# Patient Record
Sex: FEMALE | Race: WHITE | NOT HISPANIC OR LATINO | Employment: FULL TIME | URBAN - METROPOLITAN AREA
[De-identification: names, ages, dates, MRNs, and addresses within clinical notes are randomized per-mention and may not be internally consistent; named-entity substitution may affect disease eponyms.]

---
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
## 2020-10-07 ENCOUNTER — OFFICE VISIT CONVERTED (OUTPATIENT)
Dept: FAMILY MEDICINE CLINIC | Age: 20
End: 2020-10-07
Attending: FAMILY MEDICINE

## 2020-10-07 ENCOUNTER — HOSPITAL ENCOUNTER (OUTPATIENT)
Dept: OTHER | Facility: HOSPITAL | Age: 20
Discharge: HOME | End: 2020-10-07
Attending: FAMILY MEDICINE

## 2020-10-09 LAB — BACTERIA SPEC AEROBE CULT: NORMAL

## 2020-10-11 LAB — SARS-COV-2 RNA SPEC QL NAA+PROBE: NOT DETECTED

## 2020-10-22 ENCOUNTER — OFFICE VISIT CONVERTED (OUTPATIENT)
Dept: FAMILY MEDICINE CLINIC | Age: 20
End: 2020-10-22
Attending: FAMILY MEDICINE

## 2020-10-22 ENCOUNTER — HOSPITAL ENCOUNTER (OUTPATIENT)
Dept: OTHER | Facility: HOSPITAL | Age: 20
Discharge: HOME | End: 2020-10-22
Attending: FAMILY MEDICINE

## 2020-10-22 LAB
ALBUMIN SERPL-MCNC: 4.9 G/DL (ref 3.5–5)
ALBUMIN/GLOB SERPL: 2.2 {RATIO} (ref 1.4–2.6)
ALP SERPL-CCNC: 89 U/L (ref 50–130)
ALT SERPL-CCNC: 13 U/L (ref 10–40)
ANION GAP SERPL CALC-SCNC: 19 MMOL/L (ref 8–19)
AST SERPL-CCNC: 19 U/L (ref 15–50)
BASOPHILS # BLD AUTO: 0.07 10*3/UL (ref 0–0.2)
BASOPHILS NFR BLD AUTO: 0.7 % (ref 0–3)
BILIRUB SERPL-MCNC: 0.33 MG/DL (ref 0.2–1.3)
BUN SERPL-MCNC: 12 MG/DL (ref 5–25)
BUN/CREAT SERPL: 16 {RATIO} (ref 6–20)
CALCIUM SERPL-MCNC: 9.3 MG/DL (ref 8.7–10.4)
CHLORIDE SERPL-SCNC: 104 MMOL/L (ref 99–111)
CONV ABS IMM GRAN: 0.02 10*3/UL (ref 0–0.2)
CONV CO2: 23 MMOL/L (ref 22–32)
CONV IMMATURE GRAN: 0.2 % (ref 0–1.8)
CONV TOTAL PROTEIN: 7.1 G/DL (ref 6.3–8.2)
CREAT UR-MCNC: 0.77 MG/DL (ref 0.5–0.9)
DEPRECATED RDW RBC AUTO: 37.4 FL (ref 36.4–46.3)
EOSINOPHIL # BLD AUTO: 0.27 10*3/UL (ref 0–0.7)
EOSINOPHIL # BLD AUTO: 2.5 % (ref 0–7)
ERYTHROCYTE [DISTWIDTH] IN BLOOD BY AUTOMATED COUNT: 11.9 % (ref 11.7–14.4)
GFR SERPLBLD BASED ON 1.73 SQ M-ARVRAT: >60 ML/MIN/{1.73_M2}
GLOBULIN UR ELPH-MCNC: 2.2 G/DL (ref 2–3.5)
GLUCOSE SERPL-MCNC: 98 MG/DL (ref 65–99)
HCT VFR BLD AUTO: 44.3 % (ref 37–47)
HGB BLD-MCNC: 15.4 G/DL (ref 12–16)
LYMPHOCYTES # BLD AUTO: 3.79 10*3/UL (ref 1–5)
LYMPHOCYTES NFR BLD AUTO: 35.5 % (ref 20–45)
MCH RBC QN AUTO: 29.9 PG (ref 27–31)
MCHC RBC AUTO-ENTMCNC: 34.8 G/DL (ref 33–37)
MCV RBC AUTO: 86 FL (ref 81–99)
MONOCYTES # BLD AUTO: 0.6 10*3/UL (ref 0.2–1.2)
MONOCYTES NFR BLD AUTO: 5.6 % (ref 3–10)
NEUTROPHILS # BLD AUTO: 5.93 10*3/UL (ref 2–8)
NEUTROPHILS NFR BLD AUTO: 55.5 % (ref 30–85)
NRBC CBCN: 0 % (ref 0–0.7)
OSMOLALITY SERPL CALC.SUM OF ELEC: 294 MOSM/KG (ref 273–304)
PLATELET # BLD AUTO: 222 10*3/UL (ref 130–400)
PMV BLD AUTO: 11.8 FL (ref 9.4–12.3)
POTASSIUM SERPL-SCNC: 3.8 MMOL/L (ref 3.5–5.3)
RBC # BLD AUTO: 5.15 10*6/UL (ref 4.2–5.4)
SODIUM SERPL-SCNC: 142 MMOL/L (ref 135–147)
TSH SERPL-ACNC: 1.32 M[IU]/L (ref 0.27–4.2)
WBC # BLD AUTO: 10.68 10*3/UL (ref 4.8–10.8)

## 2020-12-08 ENCOUNTER — HOSPITAL ENCOUNTER (OUTPATIENT)
Dept: OTHER | Facility: HOSPITAL | Age: 20
Discharge: HOME | End: 2020-12-08
Attending: FAMILY MEDICINE

## 2020-12-10 LAB — SARS-COV-2 RNA SPEC QL NAA+PROBE: NOT DETECTED

## 2021-03-24 ENCOUNTER — OFFICE VISIT CONVERTED (OUTPATIENT)
Dept: FAMILY MEDICINE CLINIC | Age: 21
End: 2021-03-24
Attending: NURSE PRACTITIONER

## 2021-03-24 ENCOUNTER — HOSPITAL ENCOUNTER (OUTPATIENT)
Dept: OTHER | Facility: HOSPITAL | Age: 21
Discharge: HOME | End: 2021-03-24
Attending: NURSE PRACTITIONER

## 2021-03-24 LAB
BASOPHILS # BLD MANUAL: 0.06 10*3/UL (ref 0–0.2)
BASOPHILS NFR BLD MANUAL: 0.7 % (ref 0–3)
DEPRECATED RDW RBC AUTO: 38.6 FL
EOSINOPHIL # BLD MANUAL: 0.29 10*3/UL (ref 0–0.7)
EOSINOPHIL NFR BLD MANUAL: 3.2 % (ref 0–7)
ERYTHROCYTE [DISTWIDTH] IN BLOOD BY AUTOMATED COUNT: 12.5 % (ref 11.5–14.5)
GRANS (ABSOLUTE): 3.69 10*3/UL (ref 2–8)
GRANS: 41.2 % (ref 30–85)
HBA1C MFR BLD: 15.6 G/DL (ref 12–16)
HCT VFR BLD AUTO: 45.5 % (ref 37–47)
IMM GRANULOCYTES # BLD: 0.02 10*3/UL (ref 0–0.54)
IMM GRANULOCYTES NFR BLD: 0.2 % (ref 0–0.43)
LYMPHOCYTES # BLD MANUAL: 4.36 10*3/UL (ref 1–5)
LYMPHOCYTES NFR BLD MANUAL: 6.1 % (ref 3–10)
MCH RBC QN AUTO: 29.2 PG (ref 27–31)
MCHC RBC AUTO-ENTMCNC: 34.3 G/DL (ref 33–37)
MCV RBC AUTO: 85.2 FL (ref 81–99)
MONOCYTES # BLD AUTO: 0.55 10*3/UL (ref 0.2–1.2)
PLATELET # BLD AUTO: 220 10*3/UL (ref 130–400)
PMV BLD AUTO: 10.6 FL (ref 7.4–10.4)
RBC # BLD AUTO: 5.34 10*6/UL (ref 4.2–5.4)
VARIANT LYMPHS NFR BLD MANUAL: 48.6 % (ref 20–45)
WBC # BLD AUTO: 8.97 10*3/UL (ref 4.8–10.8)

## 2021-03-25 LAB
25(OH)D3 SERPL-MCNC: 18.5 NG/ML (ref 30–100)
ALBUMIN SERPL-MCNC: 4.7 G/DL (ref 3.5–5)
ALBUMIN/GLOB SERPL: 1.9 {RATIO} (ref 1.4–2.6)
ALP SERPL-CCNC: 72 U/L (ref 42–98)
ALT SERPL-CCNC: 14 U/L (ref 10–40)
ANION GAP SERPL CALC-SCNC: 18 MMOL/L (ref 8–19)
AST SERPL-CCNC: 18 U/L (ref 15–50)
BILIRUB SERPL-MCNC: 0.25 MG/DL (ref 0.2–1.3)
BUN SERPL-MCNC: 9 MG/DL (ref 5–25)
BUN/CREAT SERPL: 10 {RATIO} (ref 6–20)
CALCIUM SERPL-MCNC: 9.1 MG/DL (ref 8.7–10.4)
CHLORIDE SERPL-SCNC: 105 MMOL/L (ref 99–111)
CONV CO2: 22 MMOL/L (ref 22–32)
CONV TOTAL PROTEIN: 7.2 G/DL (ref 6.3–8.2)
CREAT UR-MCNC: 0.93 MG/DL (ref 0.5–0.9)
GFR SERPLBLD BASED ON 1.73 SQ M-ARVRAT: >60 ML/MIN/{1.73_M2}
GLOBULIN UR ELPH-MCNC: 2.5 G/DL (ref 2–3.5)
GLUCOSE SERPL-MCNC: 99 MG/DL (ref 65–99)
OSMOLALITY SERPL CALC.SUM OF ELEC: 291 MOSM/KG (ref 273–304)
POTASSIUM SERPL-SCNC: 4.1 MMOL/L (ref 3.5–5.3)
SODIUM SERPL-SCNC: 141 MMOL/L (ref 135–147)
TSH SERPL-ACNC: 2.01 M[IU]/L (ref 0.27–4.2)

## 2021-04-06 ENCOUNTER — HOSPITAL ENCOUNTER (OUTPATIENT)
Dept: OTHER | Facility: HOSPITAL | Age: 21
Discharge: HOME | End: 2021-04-06
Attending: PEDIATRICS

## 2021-04-06 LAB
ALBUMIN SERPL-MCNC: 4.8 G/DL (ref 3.5–5)
ALBUMIN/GLOB SERPL: 2 {RATIO} (ref 1.4–2.6)
ALP SERPL-CCNC: 73 U/L (ref 42–98)
ALT SERPL-CCNC: 14 U/L (ref 10–40)
ANION GAP SERPL CALC-SCNC: 17 MMOL/L (ref 8–19)
AST SERPL-CCNC: 18 U/L (ref 15–50)
BASOPHILS # BLD AUTO: 0.07 10*3/UL (ref 0–0.2)
BASOPHILS NFR BLD AUTO: 0.8 % (ref 0–3)
BILIRUB SERPL-MCNC: 0.33 MG/DL (ref 0.2–1.3)
BUN SERPL-MCNC: 9 MG/DL (ref 5–25)
BUN/CREAT SERPL: 10 {RATIO} (ref 6–20)
CALCIUM SERPL-MCNC: 9.2 MG/DL (ref 8.7–10.4)
CHLORIDE SERPL-SCNC: 105 MMOL/L (ref 99–111)
CHOLEST SERPL-MCNC: 167 MG/DL (ref 107–200)
CHOLEST/HDLC SERPL: 3.2 {RATIO} (ref 3–6)
CONV ABS IMM GRAN: 0.02 10*3/UL (ref 0–0.2)
CONV CO2: 22 MMOL/L (ref 22–32)
CONV IMMATURE GRAN: 0.2 % (ref 0–1.8)
CONV TOTAL PROTEIN: 7.2 G/DL (ref 6.3–8.2)
CREAT UR-MCNC: 0.91 MG/DL (ref 0.5–0.9)
DEPRECATED RDW RBC AUTO: 40 FL (ref 36.4–46.3)
EOSINOPHIL # BLD AUTO: 0.22 10*3/UL (ref 0–0.7)
EOSINOPHIL # BLD AUTO: 2.5 % (ref 0–7)
ERYTHROCYTE [DISTWIDTH] IN BLOOD BY AUTOMATED COUNT: 12.7 % (ref 11.7–14.4)
GFR SERPLBLD BASED ON 1.73 SQ M-ARVRAT: >60 ML/MIN/{1.73_M2}
GLOBULIN UR ELPH-MCNC: 2.4 G/DL (ref 2–3.5)
GLUCOSE SERPL-MCNC: 109 MG/DL (ref 65–99)
HCT VFR BLD AUTO: 45.7 % (ref 37–47)
HDLC SERPL-MCNC: 52 MG/DL (ref 40–60)
HGB BLD-MCNC: 15.6 G/DL (ref 12–16)
LDLC SERPL CALC-MCNC: 95 MG/DL (ref 70–100)
LYMPHOCYTES # BLD AUTO: 3.16 10*3/UL (ref 1–5)
LYMPHOCYTES NFR BLD AUTO: 35.8 % (ref 20–45)
MCH RBC QN AUTO: 29.7 PG (ref 27–31)
MCHC RBC AUTO-ENTMCNC: 34.1 G/DL (ref 33–37)
MCV RBC AUTO: 87 FL (ref 81–99)
MONOCYTES # BLD AUTO: 0.48 10*3/UL (ref 0.2–1.2)
MONOCYTES NFR BLD AUTO: 5.4 % (ref 3–10)
NEUTROPHILS # BLD AUTO: 4.88 10*3/UL (ref 2–8)
NEUTROPHILS NFR BLD AUTO: 55.3 % (ref 30–85)
NRBC CBCN: 0 % (ref 0–0.7)
OSMOLALITY SERPL CALC.SUM OF ELEC: 289 MOSM/KG (ref 273–304)
PLATELET # BLD AUTO: 213 10*3/UL (ref 130–400)
PMV BLD AUTO: 11.1 FL (ref 9.4–12.3)
POTASSIUM SERPL-SCNC: 3.8 MMOL/L (ref 3.5–5.3)
RBC # BLD AUTO: 5.25 10*6/UL (ref 4.2–5.4)
SODIUM SERPL-SCNC: 140 MMOL/L (ref 135–147)
TRIGL SERPL-MCNC: 101 MG/DL (ref 40–150)
VLDLC SERPL-MCNC: 20 MG/DL (ref 5–37)
WBC # BLD AUTO: 8.83 10*3/UL (ref 4.8–10.8)

## 2021-04-07 LAB — ESTRADIOL SERPL HS-MCNC: 12.5 PG/ML

## 2021-04-11 LAB
CONV TESTOSTERONE, FREE: 182 PG/ML
TESTOST FREE MFR SERPL: 2.5 %
TESTOST SERPL-MCNC: 726 NG/DL

## 2021-05-18 NOTE — PROGRESS NOTES
"Anthony Willams  2000     Office/Outpatient Visit    Visit Date: Thu, Oct 22, 2020 10:54 am    Provider: Lonnie Childs MD (Assistant: Nichole Morrow MA)    Location: [facility]        Electronically signed by Lonnie Childs MD on  10/22/2020 04:41:29 PM                             Subjective:        CC: Robb is a 19 year old White female.  This is her first visit to the clinic.  Establish care;         HPI:       Pt reports depression and anxiety are \"manageable.\" He notices tachycardia during anxiety attacks. Pt has depression since the age of 14 or 15. This runs in his family and he attributes it to several issues at that time. He sees Radha Davison phD in psychology for counseling in [place]. He is on pristiq 50 mg qd, lamictal 150 mg BID, and buspar. These are prescribed by [facility] at [facility].    ROS:     CONSTITUTIONAL:  Negative for chills, fatigue and fever.      EYES:  Negative for blurred vision.      E/N/T:  Negative for diminished hearing, nasal congestion and sore throat.      CARDIOVASCULAR:  Positive for tachycardia.   Negative for chest pain or palpitations.      RESPIRATORY:  Negative for recent cough and dyspnea.      GASTROINTESTINAL:  Negative for abdominal pain, dysphagia, constipation, diarrhea, nausea and vomiting.      GENITOURINARY:  Negative for dysuria and urinary incontinence.      MUSCULOSKELETAL:  Negative for arthralgias and myalgias.      INTEGUMENTARY/BREAST:  Negative for atypical mole(s) and rash.      NEUROLOGICAL:  Positive for dizziness.   Negative for paresthesias or weakness.      PSYCHIATRIC:  Positive for anxiety and depression.   Negative for sleep disturbance or suicidal thoughts.          Past Medical History / Family History / Social History:         Last Reviewed on 10/22/2020 11:24 AM by Lonnie Childs    Past Medical History:     UNREMARKABLE             PAST MEDICAL HISTORY         Asthma         GYNECOLOGICAL " HISTORY:        Menarche occurred at age 12.  Assigned sex at birth: Female.  Gender identity: Male.  Sexual orientation: Bisexual.          Surgical History:     NONE         Family History:     Unremarkable         Social History:     Occupation: a .   [employer]     Marital Status: Single     Children: None     Hobbies/Recreation: she enjoys reading and writing;     Exercise: Primary form of exercise is walking.          Tobacco/Alcohol/Supplements:     Last Reviewed on 10/22/2020 11:24 AM by Lonnie Childs    Tobacco: She has a past history of cigarette smoking; quit date:  10/1/20.  Non-drinker     Caffeine:  She admits to consuming caffeine via coffee ( 1 serving per day ) and soda ( 1 serving per day ).          Substance Abuse History:     Last Reviewed on 10/22/2020 11:24 AM by Lonnie Childs    None         Mental Health History:     Last Reviewed on 10/22/2020 11:24 AM by Lonnie Childs        Generalized Anxiety Disorder     Gender Identity Disorder     Major Depression    Bipolar Disorder         Communicable Diseases (eg STDs):     Last Reviewed on 10/22/2020 11:24 AM by Lonnie Childs        Current Problems:     Last Reviewed on 10/22/2020 11:24 AM by Lonnie Childs    Major depressive disorder, recurrent, moderate    Acute pharyngitis, unspecified    Encounter for screening for depression    Encounter for immunization        Immunizations:     DTaP 2005    DTaP 2001    DTaP 2001    DTaP 2001    DTaP 2002    DTaP 2005    PedvaxHIB (Hib PRP-OMP) 2001    PedvaxHIB (Hib PRP-OMP) 2001    PedvaxHIB (Hib PRP-OMP) 2001    PedvaxHIB (Hib PRP-OMP) 2002    Hep B (pedi/adol, 3-dose schedule) 2000    Hep B (pedi/adol, 3-dose schedule) 2001    Hep B (pedi/adol, 3-dose schedule) 2001    Menveo 2011    zzGardasil 2011    zzGardasil 2011    zzGardasil 2013    IPV  Poliovirus, inactivated 2001    IPV   Poliovirus, inactivated 4/26/2001    IPV  Poliovirus, inactivated 6/28/2001    IPV  Poliovirus, inactivated 1/7/2005    MMR  (Measles-Mumps-Rubella), live 3/8/2002    MMR  (Measles-Mumps-Rubella), live 1/7/2005    Varicella, live 7/31/2002    Varicella, live 4/28/2008    Fluarix pf 10/19/2011    Influenza A (H1N1), IM (3+ years) Monovalent 1/6/2010    Influenza, split virus (3+ years dose) 10/31/2005    Influenza, split virus (3+ years dose) 10/22/2007    Influenza, split virus (3+ years dose) 10/19/2010    FluMist 10/6/2009    FluMist 11/7/2012    Flumist Quadrivalent 10/10/2013    Adacel (Tdap) 8/12/2011        Allergies:     Last Reviewed on 10/22/2020 11:24 AM by Lonnie Childs    No Known Allergies.        Current Medications:     Last Reviewed on 10/22/2020 11:24 AM by Lonnie Childs    busPIRone 10 mg oral tablet [take 2 tablets daily]    ondansetron 4 mg oral Tablet,disintegrating    Probiotic     Singulair 10 mg oral tablet [take 1 tablet (10 mg) by oral route once daily in the evening]    Symbicort 80-4.5 mcg/actuation Inhalation HFA Aerosol Inhaler    Claritin 10 mg oral tablet [1 tab daily]    HYDROXYZ HCL TAB 25MG  [2 tablets TID. PRN]    LAMOTRIGINE  TAB 150MG  [Twice daily ]    DESVENLAFAX  TAB 50MG ER    TESTOST CYP  INJ 200MG/ML  [Once weekly. ]    COMFORT       TAB 0.35MG        Objective:        Vitals:         Current: 10/22/2020 11:01:41 AM    Ht:  5 ft, 5.5 in (68.11%);  Wt: 129 lbs (51.63%);  BMI: 21.1T: 97.4 F (temporal);  BP: 124/72 mm Hg (right arm, sitting);  P: 98 bpm (right arm (BP Cuff), sitting);  sCr: 0.5 mg/dL;  GFR: 183.01        Exams:     PHYSICAL EXAM:     GENERAL: vital signs recorded - well developed, well nourished;  well groomed;  no apparent distress;     EYES: extraocular movements intact; conjunctiva and cornea are normal; PERRLA;     E/N/T: OROPHARYNX: posterior pharynx, including tonsils, tongue, and uvula are normal;     NECK: range of motion is normal; thyroid exam  reveals not enlarged;     RESPIRATORY: normal respiratory rate and pattern with no distress; normal breath sounds with no rales, rhonchi, wheezes or rubs;     CARDIOVASCULAR: mildly tachycardic;  rhythm is regular;  no systolic murmur; no edema;     GASTROINTESTINAL: nontender; normal bowel sounds;     MUSCULOSKELETAL: normal gait; normal overall tone     NEUROLOGIC: mental status: alert and oriented x 3; GROSSLY INTACT     PSYCHIATRIC:  appropriate affect and demeanor; normal speech pattern; grossly normal memory;         Procedures:     Encounter for immunization    1. Patient experienced no reaction.  Regarding contraindications to an Influenza vaccine: Denies moderate/severe illness with/without fever; serious reaction to eggs, egg proteins, gentamicin, gelatin, arginine, neomycin or polymixin; serious reaction after recieving previous influenza vaccines; and history of Guillain-Otto Syndrome.              Assessment:         Z23   Encounter for immunization       F33.1   Major depressive disorder, recurrent, moderate           ORDERS:         Lab Orders:       58058  [facility] CBC with 3 part diff  (Send-Out)            40580  COMP [facility] Comp. Metabolic Panel  (Send-Out)            37185  TSH - [facility] TSH  (Send-Out)              Procedures Ordered:       [id number]  Immunization administration; one vaccine  (In-House)              Other Orders:       51271  Influenza virus vaccine, quadrivalent, split virus, preservative free 3 years of age & older  (In-House)                      Plan:         Encounter for immunization          Immunizations:       88628  Immunization administration; one vaccine  (In-House)            08205  Influenza virus vaccine, quadrivalent, split virus, preservative free 3 years of age & older  (In-House)                Dose (ml): 0.5  Site: right deltoid  Route: intramuscular  Administered by: Nichole Morrow          : [employer]  Lot #: [id number]  Exp: 06/30/2021           NDC: [address]-80        Major depressive disorder, recurrent, moderateDepression seems well controlled with counseling and meds. Cont pristiq 50 mg qd, lamictal 150 mg BID, and buspar. These are prescribed by Aileen Stover [facility].    LABORATORY:  Labs ordered to be performed today include CBC, Comprehensive metabolic panel, and TSH.            Orders:       [id number]  [facility] CBC with 3 part diff  (Send-Out)            83979  COMP - [facility] Comp. Metabolic Panel  (Send-Out)            50515  TSH - [facility] TSH  (Send-Out)                  Charge Capture:         Primary Diagnosis:     Z23  Encounter for immunization           Orders:      [id number]  Office/outpatient visit; established patient, level 3  (In-House)            93999  Immunization administration; one vaccine  (In-House)            99540  Influenza virus vaccine, quadrivalent, split virus, preservative free 3 years of age & older  (In-House)              F33.1  Major depressive disorder, recurrent, moderate

## 2021-05-18 NOTE — PROGRESS NOTES
Anthony Willams  2000     Office/Outpatient Visit    Visit Date: Wed, Mar 24, 2021 01:31 pm    Provider: Ruth Ann Larose N.P. (Assistant: Aye Munoz MA)    Location: [facility]        Electronically signed by Ruth Ann Larose N.P. on  2021 02:23:12 PM                             Subjective:        CC: Robb is a 20 year old White female.  Patient presents today for establishment (not taking Claritin, Buspirone, Probiotic, Singulair);         HPI: Anthony is here today with c/o fatigue, headaches, rise in blood pressure, increased heart rate, depersonalization. Pt  has depression since the age of 14 or 15. This runs in his family. He sees Radha Davison phD in psychology for counseling in [place]. He is on pristiq 50 mg qd, lamictal 150 mg BID. These are prescribed by Aileen Stover at [facility]. Also seeing Alvarado Bhagat at [facility] who has him on testosterone hormones. Denies any recent medication changes. Notes mood swings are manageable but anxiety has been bad recently. Notes that heart rate seems to be high every other day. Denies school being increasingly stressful recently.    ROS:     CONSTITUTIONAL:  Positive for fatigue.   Negative for chills or fever.      CARDIOVASCULAR:  Positive for palpitations and tachycardia.      RESPIRATORY:  Negative for dyspnea and frequent wheezing.      GASTROINTESTINAL:  Negative for abdominal pain and vomiting.      INTEGUMENTARY/BREAST:  Negative for rash.      NEUROLOGICAL:  Negative for dizziness and headaches.      PSYCHIATRIC:  Positive for anxiety.   Negative for suicidal thoughts.          Past Medical History / Family History / Social History:         Last Reviewed on 3/24/2021 01:50 PM by Ruth Ann Larose    Past Medical History:                 PAST MEDICAL HISTORY         Asthma     Depression    Anxiety    Bipolar Disorder         GYNECOLOGICAL HISTORY:        Menarche occurred at age 12.  Assigned sex at birth:  Female.  Gender identity: Male.  Sexual orientation: Bisexual.          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          Tobacco/Alcohol/Supplements:     Last Reviewed on 3/24/2021 01:36 PM by Aye Munoz    Tobacco: She has a past history of cigarette smoking; quit date:  10/1/20.   Uses E-cig Non-drinker     Caffeine:  She admits to consuming caffeine via coffee ( 1 serving per day ) and soda ( 1 serving per day ).          Substance Abuse History:     Last Reviewed on 10/22/2020 11:24 AM by Lonnie Childs    None         Mental Health History:     Last Reviewed on 10/22/2020 11:24 AM by Lonnie Childs        Generalized Anxiety Disorder     Gender Identity Disorder     Major Depression    Bipolar Disorder         Communicable Diseases (eg STDs):     Last Reviewed on 10/22/2020 11:24 AM by Lonnie Childs        Current Problems:     Last Reviewed on 10/22/2020 11:24 AM by Lonnie Childs    Contact with and (suspected) exposure to other viral communicable diseases    Major depressive disorder, recurrent, moderate    Acute pharyngitis, unspecified    Encounter for screening for depression    Encounter for immunization        Immunizations:     influenza, injectable, quadrivalent, preservative free (FLUZONE QUAD 3500-2695) 10/22/2020    DTaP 4/7/2005    DTaP 2/22/2001    DTaP 4/26/2001    DTaP 6/28/2001    DTaP 7/31/2002    DTaP 1/7/2005    PedvaxHIB (Hib PRP-OMP) 2/22/2001    PedvaxHIB (Hib PRP-OMP) 4/26/2001    PedvaxHIB (Hib PRP-OMP) 6/28/2001    PedvaxHIB (Hib PRP-OMP) 7/31/2002    Hep B (pedi/adol, 3-dose schedule) 2000    Hep B (pedi/adol, 3-dose schedule) 2/22/2001    Hep B (pedi/adol,  3-dose schedule) 6/28/2001    Menveo 8/12/2011    zzGardasil 8/12/2011    zzGardasil 12/12/2011    zzGardasil 1/29/2013    IPV  Poliovirus, inactivated 2/22/2001    IPV  Poliovirus, inactivated 4/26/2001    IPV  Poliovirus, inactivated 6/28/2001    IPV  Poliovirus, inactivated 1/7/2005    MMR  (Measles-Mumps-Rubella), live 3/8/2002    MMR  (Measles-Mumps-Rubella), live 1/7/2005    Varicella, live 7/31/2002    Varicella, live 4/28/2008    Fluarix pf 10/19/2011    Influenza A (H1N1), IM (3+ years) Monovalent 1/6/2010    Influenza, split virus (3+ years dose) 10/31/2005    Influenza, split virus (3+ years dose) 10/22/2007    Influenza, split virus (3+ years dose) 10/19/2010    FluMist 10/6/2009    FluMist 11/7/2012    Flumist Quadrivalent 10/10/2013    Adacel (Tdap) 8/12/2011        Allergies:     Last Reviewed on 3/24/2021 01:36 PM by Aye Munoz    No Known Allergies.        Current Medications:     Last Reviewed on 3/24/2021 01:36 PM by Aye Munoz    ondansetron 4 mg oral Tablet,disintegrating    Symbicort 80-4.5 mcg/actuation Inhalation HFA Aerosol Inhaler    HYDROXYZ HCL TAB 25MG  [2 tablets TID. PRN]    LAMOTRIGINE  TAB 150MG  [Twice daily ]    DESVENLAFAX  TAB 50MG ER    TESTOST CYP  INJ 200MG/ML  [Once weekly. ]    COMFORT       TAB 0.35MG        Objective:        Vitals:         Historical:     10/22/2020  BP:   124/72 mm Hg ( (right arm, , sitting, );) 10/22/2020  P:   98bpm ( (right arm (BP Cuff), , sitting, );) 10/22/2020  Wt:   129lbs    Current: 3/24/2021 1:37:12 PM    Ht:  5 ft, 5.5 in;  Wt: 136 lbs;  BMI: 22.3T: 97.1 F (temporal);  BP: 114/70 mm Hg (right arm, sitting);  P: 95 bpm (right arm (BP Cuff), sitting);  sCr: 0.77 mg/dL;  GFR: 111.79        Exams:     PHYSICAL EXAM:     GENERAL: well developed, well nourished;  no apparent distress;     RESPIRATORY: normal respiratory rate and pattern with no distress; normal breath sounds with no rales, rhonchi, wheezes or rubs;     CARDIOVASCULAR:  normal rate; rhythm is regular;     MUSCULOSKELETAL: normal gait;     NEUROLOGIC: mental status: alert and oriented x 3; GROSSLY INTACT     PSYCHIATRIC: appropriate affect and demeanor; normal speech pattern; normal thought and perception;         Assessment:         R00.0   Tachycardia, unspecified       R53.83   Other fatigue       F31.9   Bipolar disorder, unspecified           ORDERS:         Lab Orders:       [id number]  [facility] CBC with 3 part diff  (Send-Out)            03508  COMP - [facility] Comp. Metabolic Panel  (Send-Out)            28584  TSH - [facility] TSH  (Send-Out)            40083  VITD - [facility] Vitamin D, 25 Hydroxy  (Send-Out)                      Plan: Greater than 30 minutes spent reviewing past medical records, face to face visit, and coordinating care with support staff.         Tachycardia, unspecifiedDiscussed recommendation for ECG and possible Holter monitor today. Anthony denies ECG at this time after discussing process of how performed.  Will proceed with lab work. Have also encouraged him to go to bed and wake at same time each day. Avoid caffeine late in day. Have also suggested that he get in soon with Aileen Stover as he may need some medication adjustments made.    LABORATORY:  Labs ordered to be performed today include CBC, Comprehensive metabolic panel, TSH, and Vitamin D.            Orders:       [id number]  [facility] CBC with 3 part diff  (Send-Out)            58379  [facility] Comp. Metabolic Panel  (Send-Out)            45004  TSH - [facility] TSH  (Send-Out)            30794  VITD - [facility] Vitamin D, 25 Hydroxy  (Send-Out)              Other fatigueAs above         Bipolar disorder, unspecifiedFollow with counselor and mental health provider.             Charge Capture:         Primary Diagnosis:     R00.0  Tachycardia, unspecified           Orders:      05122  Office/outpatient visit; established patient, level 4  (In-House)              R53.83  Other fatigue     F31.9  Bipolar disorder, unspecified

## 2021-05-18 NOTE — PROGRESS NOTES
Anthony Willams  2000     Office/Outpatient Visit    Visit Date: Wed, Oct 7, 2020 12:57 pm    Provider: Lonnie Childs MD (Assistant: Lori Zayas MA)    Location: [facility]        Electronically signed by Lonnie Childs MD on  10/07/2020 05:17:25 PM                             Subjective:        CC: Robb is a 19 year old White female.  This is her first visit to the clinic.  presents today due to sore throat, fatigue         HPI:       Pt is worried he has strep throat, his mother sees white spots and there is pain with talking and swallowing. He has fatigue and motion sickness for the past couple of days. Sx started 4-5 days with sore throat, fatigue for 2 days. No fever, maybe chills, maybe nausea. Worst Sx is sore throat. He works at [employer]. He had similar Sx a little over a month ago and rapid strep was [facility] at [facility].      Pt has depression since the age of 14 or 15. This runs in his family and he attributes it to several issues at that time. He sees Radha Davison phD in psychology for counseling in [place]. He is on pristiq 50 mg qd, lamictal 150 mg BID, and buspar. These are prescribed by [facility] at [facility].          PHQ-9 Depression Screening: Completed form scanned and in chart; Total Score 21     ROS:     CONSTITUTIONAL:  Positive for chills and fatigue ( moderate ).   Negative for fever.      EYES:  Negative for blurred vision.      E/N/T:  Positive for sore throat.   Negative for diminished hearing or nasal congestion.      CARDIOVASCULAR:  Negative for chest pain and palpitations.      RESPIRATORY:  Negative for recent cough and dyspnea.      GASTROINTESTINAL:  Positive for dysphagia and nausea.   Negative for abdominal pain, constipation, diarrhea or vomiting.      GENITOURINARY:  Negative for dysuria and urinary incontinence.      MUSCULOSKELETAL:  Negative for arthralgias and myalgias.       INTEGUMENTARY/BREAST:  Negative for atypical mole(s) and rash.      NEUROLOGICAL:  Positive for dizziness.   Negative for paresthesias or weakness.      PSYCHIATRIC:  Positive for anxiety and depression.   Negative for sleep disturbance or suicidal thoughts.          Past Medical History / Family History / Social History:         Last Reviewed on 10/07/2020 01:29 PM by Lonnie Childs    Past Medical History:     UNREMARKABLE             PAST MEDICAL HISTORY         Asthma         GYNECOLOGICAL HISTORY:        Menarche occurred at age 12.  Assigned sex at birth: Female.  Gender identity: Male.  Sexual orientation: Bisexual.          Surgical History:     NONE         Family History:     Unremarkable         Social History:     Occupation: a .   [employer]     Marital Status: Single     Children: None     Hobbies/Recreation: she enjoys reading and writing;     Exercise: Primary form of exercise is walking.          Tobacco/Alcohol/Supplements:     Last Reviewed on 10/07/2020 01:29 PM by Lonnie Childs    Tobacco: She has a past history of cigarette smoking; quit date:  10/1/20.  Non-drinker     Caffeine:  She admits to consuming caffeine via coffee ( 1 serving per day ) and soda ( 1 serving per day ).          Substance Abuse History:     Last Reviewed on 10/07/2020 01:29 PM by Lonnie Childs    None         Mental Health History:     Last Reviewed on 10/07/2020 01:29 PM by Lonnie Childs        Generalized Anxiety Disorder     Gender Identity Disorder     Major Depression    Bipolar Disorder         Communicable Diseases (eg STDs):     Last Reviewed on 10/07/2020 01:29 PM by Lonnie Childs        Current Problems:     Last Reviewed on 10/07/2020 01:29 PM by Lonnie Childs    Allergic rhinitis    Proteinuria    Acute pharyngitis, unspecified        Immunizations:     DTaP 2005    DTaP 2001    DTaP 2001    DTaP 2001    DTaP 2002    DTaP 2005    PedvaxHIB (Hib  PRP-OMP) 2/22/2001    PedvaxHIB (Hib PRP-OMP) 4/26/2001    PedvaxHIB (Hib PRP-OMP) 6/28/2001    PedvaxHIB (Hib PRP-OMP) 7/31/2002    Hep B (pedi/adol, 3-dose schedule) 2000    Hep B (pedi/adol, 3-dose schedule) 2/22/2001    Hep B (pedi/adol, 3-dose schedule) 6/28/2001    Menveo 8/12/2011    zzGardasil 8/12/2011    zzGardasil 12/12/2011    zzGardasil 1/29/2013    IPV  Poliovirus, inactivated 2/22/2001    IPV  Poliovirus, inactivated 4/26/2001    IPV  Poliovirus, inactivated 6/28/2001    IPV  Poliovirus, inactivated 1/7/2005    MMR  (Measles-Mumps-Rubella), live 3/8/2002    MMR  (Measles-Mumps-Rubella), live 1/7/2005    Varicella, live 7/31/2002    Varicella, live 4/28/2008    Fluarix pf 10/19/2011    Influenza A (H1N1), IM (3+ years) Monovalent 1/6/2010    Influenza, split virus (3+ years dose) 10/31/2005    Influenza, split virus (3+ years dose) 10/22/2007    Influenza, split virus (3+ years dose) 10/19/2010    FluMist 10/6/2009    FluMist 11/7/2012    Flumist Quadrivalent 10/10/2013    Adacel (Tdap) 8/12/2011        Allergies:     Last Reviewed on 10/07/2020 01:29 PM by Lonnie Childs    No Known Allergies.        Current Medications:     Last Reviewed on 10/07/2020 01:29 PM by Lonnie Childs    Claritin 10 mg oral tablet [1 tab daily]    busPIRone 10 mg oral tablet [take 2 tablets daily]    ondansetron 4 mg oral Tablet,disintegrating    Probiotic     Singulair 10 mg oral tablet [take 1 tablet (10 mg) by oral route once daily in the evening]    Symbicort 80-4.5 mcg/actuation Inhalation HFA Aerosol Inhaler    HYDROXYZ HCL TAB 25MG    LAMOTRIGINE  TAB 150MG    DESVENLAFAX  TAB 50MG ER    TESTOST CYP  INJ 200MG/ML    COMFORT       TAB 0.35MG        Objective:        Vitals:         Current: 10/7/2020 1:06:44 PM    Ht:  5 ft, 5.5 in (68.12%);  Wt: 130.7 lbs (54.82%);  BMI: 21.4T: 97.3 F (temporal);  BP: 117/61 mm Hg (right arm, sitting);  P: 73 bpm (right arm (BP Cuff), sitting);  sCr: 0.5 mg/dL;  GFR:  183.01        Exams:     PHYSICAL EXAM:     GENERAL: vital signs recorded - well developed, well nourished;  well groomed;  no apparent distress, appears minimally ill;     EYES: extraocular movements intact; conjunctiva and cornea are normal; PERRLA;     E/N/T: OROPHARYNX: posterior pharynx shows erythema and 1 small white patch on medial left tonsil;     NECK: range of motion is normal; thyroid exam reveals not enlarged;     RESPIRATORY: normal respiratory rate and pattern with no distress; normal breath sounds with no rales, rhonchi, wheezes or rubs;     CARDIOVASCULAR: normal rate; rhythm is regular;  no systolic murmur; no edema;     GASTROINTESTINAL: nontender; normal bowel sounds;     LYMPHATIC: no enlargement of cervical or facial nodes;     MUSCULOSKELETAL: normal gait; normal overall tone     NEUROLOGIC: mental status: alert and oriented x 3; GROSSLY INTACT     PSYCHIATRIC:  appropriate affect and demeanor; normal speech pattern; grossly normal memory;         Lab/Test Results:         Rapid Strep Screen: Negative (10/07/2020),     Performed by:: fermin (10/07/2020),             Assessment:         J02.9   Acute pharyngitis, unspecified       F33.1   Major depressive disorder, recurrent, moderate       Z13.31   Encounter for screening for depression           ORDERS:         Radiology/Test Orders:       14609  COVID 19 Testing  (Send-Out)              Lab Orders:       FUTURE  Future order to be done at patients convenience  (Send-Out)            91831  [facility] CBC with 3 part diff  (Send-Out)            48478  [facility] Comp. Metabolic Panel  (Send-Out)            31679  [facility] Rapid strep A  (In-House)            APPTO  Appointment need  (In-House)            17119  [facility] Throat culture, strep  (Send-Out)              Other Orders:         Depression screen positive and follow up plan documented  (In-House)                      Plan:         Acute pharyngitis, unspecifiedStrep test is  negative although there is a small white patch on medial left tonsil. Pt advised if Sx do not improve then augmentin can be presribed. Pt likely has viral pharyngitis and COVID test is pending.     LABORATORY:  Labs ordered to be performed today include COVID 19 Testing and rapid strep test.            Orders:       [id number]  COVID 19 Testing  (Send-Out)            96541  [facility] Rapid strep A  (In-House)            32977  [facility] Throat culture, strep  (Send-Out)              Major depressive disorder, recurrent, moderatePt has depression since the age of 14 or 15 and origin appears to be multifactorial, possibly including genetic, social, and gender identity issues. Pt advised to f/u with Radha Davison phD for counseling in [place] and Aileen Stover for medication management. Cont pristiq 50 mg qd, lamictal 150 mg BID, and buspar.         FOLLOW-UP: Schedule a follow-up visit in 3 months.:.      FOLLOW-UP TESTING #1: FOLLOW-UP LABORATORY:  Labs to be scheduled in the future include CBC and CMP.            Orders:       FUTURE  Future order to be done at patients convenience  (Send-Out)            34746  [facility] CBC with 3 part diff  (Send-Out)            09923  [facility] Comp. Metabolic Panel  (Send-Out)            APPTO  Appointment need  (In-House)              Encounter for screening for depression    MIPS PHQ-9 Depression Screening: Completed form scanned and in chart; Total Score 21 Positive Depression Screen: Suicide Risk Assessment completed--denies suicidal/homicidal ideation           Orders:         Depression screen positive and follow up plan documented  (In-House)                  Patient Recommendations:        For  Major depressive disorder, recurrent, moderate:    Schedule a follow-up visit in 3 months.                APPOINTMENT INFORMATION:        Monday Tuesday Wednesday Thursday Friday Saturday Sunday            Time:___________________AM  PM   Date:_____________________          The following laboratory testing has been ordered: CBC metabolic panel, comprehensive             Charge Capture:         Primary Diagnosis:     J02.9  Acute pharyngitis, unspecified           Orders:      37241  Office visit - new pt, level 2  (In-House)            71591  [facility] Rapid strep A  (In-House)              F33.1  Major depressive disorder, recurrent, moderate           Orders:      APPTO  Appointment need  (In-House)              Z13.31  Encounter for screening for depression           Orders:        Depression screen positive and follow up plan documented  (In-House)                  ADDENDUMS:      ____________________________________    Addendum: 10/27/2020 05:47 PM - Lonnie Childs        Addendum: remove [id number], add 13317

## 2021-07-02 VITALS
HEIGHT: 66 IN | DIASTOLIC BLOOD PRESSURE: 72 MMHG | SYSTOLIC BLOOD PRESSURE: 124 MMHG | BODY MASS INDEX: 20.73 KG/M2 | TEMPERATURE: 97.4 F | WEIGHT: 129 LBS | HEART RATE: 98 BPM

## 2021-07-02 VITALS
BODY MASS INDEX: 21.86 KG/M2 | TEMPERATURE: 97.1 F | DIASTOLIC BLOOD PRESSURE: 70 MMHG | SYSTOLIC BLOOD PRESSURE: 114 MMHG | HEIGHT: 66 IN | WEIGHT: 136 LBS | HEART RATE: 95 BPM

## 2021-07-02 VITALS
BODY MASS INDEX: 21 KG/M2 | HEIGHT: 66 IN | WEIGHT: 130.7 LBS | SYSTOLIC BLOOD PRESSURE: 117 MMHG | HEART RATE: 73 BPM | DIASTOLIC BLOOD PRESSURE: 61 MMHG | TEMPERATURE: 97.3 F

## 2021-10-28 ENCOUNTER — HOSPITAL ENCOUNTER (OUTPATIENT)
Dept: GENERAL RADIOLOGY | Facility: HOSPITAL | Age: 21
Discharge: HOME | End: 2021-10-28

## 2021-10-28 ENCOUNTER — LAB (OUTPATIENT)
Dept: LAB | Facility: HOSPITAL | Age: 21
End: 2021-10-28

## 2021-10-28 ENCOUNTER — OFFICE VISIT (OUTPATIENT)
Dept: FAMILY MEDICINE CLINIC | Age: 21
End: 2021-10-28

## 2021-10-28 VITALS
BODY MASS INDEX: 21.89 KG/M2 | DIASTOLIC BLOOD PRESSURE: 67 MMHG | TEMPERATURE: 98.7 F | HEIGHT: 66 IN | SYSTOLIC BLOOD PRESSURE: 119 MMHG | WEIGHT: 136.2 LBS | HEART RATE: 93 BPM

## 2021-10-28 DIAGNOSIS — K59.00 CONSTIPATION, UNSPECIFIED CONSTIPATION TYPE: Primary | ICD-10-CM

## 2021-10-28 DIAGNOSIS — R32 URINARY INCONTINENCE, UNSPECIFIED TYPE: ICD-10-CM

## 2021-10-28 DIAGNOSIS — K59.00 CONSTIPATION, UNSPECIFIED CONSTIPATION TYPE: ICD-10-CM

## 2021-10-28 DIAGNOSIS — L04.2 ACUTE AXILLARY LYMPHADENITIS: ICD-10-CM

## 2021-10-28 DIAGNOSIS — Z23 ENCOUNTER FOR IMMUNIZATION: ICD-10-CM

## 2021-10-28 LAB
ALBUMIN SERPL-MCNC: 5 G/DL (ref 3.5–5.2)
ALBUMIN/GLOB SERPL: 2.2 G/DL
ALP SERPL-CCNC: 77 U/L (ref 39–117)
ALT SERPL W P-5'-P-CCNC: 25 U/L (ref 1–33)
ANION GAP SERPL CALCULATED.3IONS-SCNC: 8.9 MMOL/L (ref 5–15)
AST SERPL-CCNC: 18 U/L (ref 1–32)
BASOPHILS # BLD AUTO: 0.05 10*3/MM3 (ref 0–0.2)
BASOPHILS NFR BLD AUTO: 0.6 % (ref 0–1.5)
BILIRUB SERPL-MCNC: 0.8 MG/DL (ref 0–1.2)
BILIRUB UR QL STRIP: NEGATIVE
BUN SERPL-MCNC: 10 MG/DL (ref 6–20)
BUN/CREAT SERPL: 12 (ref 7–25)
CALCIUM SPEC-SCNC: 9.7 MG/DL (ref 8.6–10.5)
CHLORIDE SERPL-SCNC: 101 MMOL/L (ref 98–107)
CLARITY UR: CLEAR
CO2 SERPL-SCNC: 29.1 MMOL/L (ref 22–29)
COLOR UR: YELLOW
CREAT SERPL-MCNC: 0.83 MG/DL (ref 0.57–1)
DEPRECATED RDW RBC AUTO: 39 FL (ref 37–54)
EOSINOPHIL # BLD AUTO: 0.23 10*3/MM3 (ref 0–0.4)
EOSINOPHIL NFR BLD AUTO: 3 % (ref 0.3–6.2)
ERYTHROCYTE [DISTWIDTH] IN BLOOD BY AUTOMATED COUNT: 12.5 % (ref 12.3–15.4)
GFR SERPL CREATININE-BSD FRML MDRD: 88 ML/MIN/1.73
GLOBULIN UR ELPH-MCNC: 2.3 GM/DL
GLUCOSE SERPL-MCNC: 74 MG/DL (ref 65–99)
GLUCOSE UR STRIP-MCNC: NEGATIVE MG/DL
HCT VFR BLD AUTO: 45.5 % (ref 34–46.6)
HGB BLD-MCNC: 15.7 G/DL (ref 12–15.9)
HGB UR QL STRIP.AUTO: NEGATIVE
IMM GRANULOCYTES # BLD AUTO: 0.02 10*3/MM3 (ref 0–0.05)
IMM GRANULOCYTES NFR BLD AUTO: 0.3 % (ref 0–0.5)
KETONES UR QL STRIP: NEGATIVE
LEUKOCYTE ESTERASE UR QL STRIP.AUTO: NEGATIVE
LYMPHOCYTES # BLD AUTO: 2.81 10*3/MM3 (ref 0.7–3.1)
LYMPHOCYTES NFR BLD AUTO: 36.2 % (ref 19.6–45.3)
MCH RBC QN AUTO: 29.5 PG (ref 26.6–33)
MCHC RBC AUTO-ENTMCNC: 34.5 G/DL (ref 31.5–35.7)
MCV RBC AUTO: 85.5 FL (ref 79–97)
MONOCYTES # BLD AUTO: 0.63 10*3/MM3 (ref 0.1–0.9)
MONOCYTES NFR BLD AUTO: 8.1 % (ref 5–12)
NEUTROPHILS NFR BLD AUTO: 4.02 10*3/MM3 (ref 1.7–7)
NEUTROPHILS NFR BLD AUTO: 51.8 % (ref 42.7–76)
NITRITE UR QL STRIP: NEGATIVE
PH UR STRIP.AUTO: 7 [PH] (ref 5–8)
PLATELET # BLD AUTO: 217 10*3/MM3 (ref 140–450)
PMV BLD AUTO: 10.4 FL (ref 6–12)
POTASSIUM SERPL-SCNC: 4.2 MMOL/L (ref 3.5–5.2)
PROT SERPL-MCNC: 7.3 G/DL (ref 6–8.5)
PROT UR QL STRIP: ABNORMAL
RBC # BLD AUTO: 5.32 10*6/MM3 (ref 3.77–5.28)
SODIUM SERPL-SCNC: 139 MMOL/L (ref 136–145)
SP GR UR STRIP: 1.02 (ref 1–1.03)
UROBILINOGEN UR QL STRIP: ABNORMAL
WBC # BLD AUTO: 7.76 10*3/MM3 (ref 3.4–10.8)

## 2021-10-28 PROCEDURE — 85025 COMPLETE CBC W/AUTO DIFF WBC: CPT

## 2021-10-28 PROCEDURE — 36415 COLL VENOUS BLD VENIPUNCTURE: CPT

## 2021-10-28 PROCEDURE — 90686 IIV4 VACC NO PRSV 0.5 ML IM: CPT | Performed by: NURSE PRACTITIONER

## 2021-10-28 PROCEDURE — 80053 COMPREHEN METABOLIC PANEL: CPT

## 2021-10-28 PROCEDURE — 90471 IMMUNIZATION ADMIN: CPT | Performed by: NURSE PRACTITIONER

## 2021-10-28 PROCEDURE — 74018 RADEX ABDOMEN 1 VIEW: CPT

## 2021-10-28 PROCEDURE — 99213 OFFICE O/P EST LOW 20 MIN: CPT | Performed by: NURSE PRACTITIONER

## 2021-10-28 PROCEDURE — 81003 URINALYSIS AUTO W/O SCOPE: CPT

## 2021-10-28 NOTE — PROGRESS NOTES
"Chief Complaint  Insomnia and Constipation (+2 days since last MB )    Subjective    Patient is 20 years old in today with complaints of swollen lymph nodes, constipation, nausea and vomiting, and insomnia that has been going on for about 3 weeks. Patient denies recent illness. Patient has tried OTC stool softeners and laxative without relief. Denies fever.          Anthony Willams presents to [facility]  Insomnia  This is a new problem. The current episode started 1 to 4 weeks ago. The problem occurs intermittently. The problem has been waxing and waning. Associated symptoms include abdominal pain, a change in bowel habit, nausea, swollen glands, urinary symptoms and vomiting. Pertinent negatives include no chills, fever or headaches.   Constipation  This is a new problem. The current episode started 1 to 4 weeks ago. The problem has been gradually worsening since onset. Associated symptoms include abdominal pain, nausea and vomiting. Pertinent negatives include no fever. She has tried laxatives, diet changes and stool softeners for the symptoms. The treatment provided no relief.       Objective   Vital Signs:   /67 (BP Location: Left arm, Patient Position: Sitting)   Pulse 93   Temp 98.7 °F (37.1 °C)   Ht 166.4 cm (65.51\")   Wt 61.8 kg (136 lb 3.2 oz)   BMI 22.31 kg/m²     Physical Exam  Constitutional:       Appearance: She is not ill-appearing.   HENT:      Head: Normocephalic.      Right Ear: Tympanic membrane and external ear normal.      Left Ear: Tympanic membrane, ear canal and external ear normal.      Nose: Nose normal.      Mouth/Throat:      Mouth: Mucous membranes are moist.      Pharynx: Oropharynx is clear. No posterior oropharyngeal erythema.   Eyes:      Extraocular Movements: Extraocular movements intact.      Conjunctiva/sclera: Conjunctivae normal.      Pupils: Pupils are equal, round, and reactive to light.   Cardiovascular:      Rate and Rhythm: " Normal rate and regular rhythm.      Pulses: Normal pulses.      Heart sounds: Normal heart sounds.   Pulmonary:      Effort: Pulmonary effort is normal. No respiratory distress.      Breath sounds: Normal breath sounds. No stridor. No wheezing, rhonchi or rales.   Chest:   Breasts:      Right: Axillary adenopathy present. No supraclavicular adenopathy.      Left: No axillary adenopathy or supraclavicular adenopathy.       Abdominal:      General: Bowel sounds are decreased.      Tenderness: There is abdominal tenderness in the epigastric area.   Lymphadenopathy:      Head:      Right side of head: No submental, submandibular, tonsillar, preauricular, posterior auricular or occipital adenopathy.      Left side of head: No submental, submandibular, tonsillar, preauricular, posterior auricular or occipital adenopathy.      Cervical: No cervical adenopathy.      Right cervical: No superficial, deep or posterior cervical adenopathy.     Left cervical: No superficial, deep or posterior cervical adenopathy.      Upper Body:      Right upper body: Axillary adenopathy present. No supraclavicular adenopathy.      Left upper body: No supraclavicular or axillary adenopathy.   Skin:     General: Skin is warm and dry.   Neurological:      Mental Status: She is alert and oriented to person, place, and time.   Psychiatric:         Mood and Affect: Mood normal.         Speech: Speech normal.         Behavior: Behavior is withdrawn. Behavior is cooperative.        Result Review :                 Assessment and Plan    Diagnoses and all orders for this visit:    1. Constipation, unspecified constipation type (Primary)  -     CBC w AUTO Differential; Future  -     Comprehensive metabolic panel; Future  -     XR Abdomen KUB; Future    2. Urinary incontinence, unspecified type  -     Urinalysis With Culture If Indicated - Urine, Clean Catch; Future    3. Encounter for immunization  -     FluLaval/Fluarix/Fluzone >6 Months  ([id number])    4. Acute axillary lymphadenitis  -     CBC w AUTO Differential; Future        Follow Up   Return if symptoms worsen or fail to improve.  Patient was given instructions and counseling regarding her condition or for health maintenance advice. Please see specific information pulled into the AVS if appropriate.

## 2021-10-29 RX ORDER — AMOXICILLIN 250 MG
1 CAPSULE ORAL DAILY
Qty: 30 TABLET | Refills: 3 | Status: SHIPPED | OUTPATIENT
Start: 2021-10-29

## 2021-10-29 NOTE — PROGRESS NOTES
Xray showed constipation. Sent in stool softener for daily use. Recommend increased fluid intake and OTC laxatives as directed until bowel movement achieved. Labs so no signs of infection.

## 2022-04-22 ENCOUNTER — OFFICE VISIT (OUTPATIENT)
Dept: FAMILY MEDICINE CLINIC | Age: 22
End: 2022-04-22

## 2022-04-22 VITALS
BODY MASS INDEX: 23.18 KG/M2 | DIASTOLIC BLOOD PRESSURE: 63 MMHG | HEIGHT: 66 IN | SYSTOLIC BLOOD PRESSURE: 106 MMHG | HEART RATE: 87 BPM | OXYGEN SATURATION: 96 % | WEIGHT: 144.2 LBS | TEMPERATURE: 98.6 F

## 2022-04-22 DIAGNOSIS — F33.1 MODERATE EPISODE OF RECURRENT MAJOR DEPRESSIVE DISORDER: Primary | ICD-10-CM

## 2022-04-22 DIAGNOSIS — L08.9 FINGER INFECTION: ICD-10-CM

## 2022-04-22 PROBLEM — F42.8 OBSESSIONAL THOUGHTS: Status: ACTIVE | Noted: 2018-03-29

## 2022-04-22 PROBLEM — F41.1 GAD (GENERALIZED ANXIETY DISORDER): Status: ACTIVE | Noted: 2018-03-29

## 2022-04-22 PROCEDURE — 99213 OFFICE O/P EST LOW 20 MIN: CPT | Performed by: NURSE PRACTITIONER

## 2022-04-22 RX ORDER — FLUVOXAMINE MALEATE 100 MG/1
CAPSULE, EXTENDED RELEASE ORAL
COMMUNITY
Start: 2022-04-19 | End: 2022-07-28

## 2022-04-22 RX ORDER — SULFAMETHOXAZOLE AND TRIMETHOPRIM 800; 160 MG/1; MG/1
1 TABLET ORAL 2 TIMES DAILY
Qty: 10 TABLET | Refills: 0 | Status: SHIPPED | OUTPATIENT
Start: 2022-04-22 | End: 2022-04-27

## 2022-04-22 RX ORDER — LAMOTRIGINE 150 MG/1
150 TABLET ORAL 2 TIMES DAILY
Qty: 60 TABLET | Refills: 1 | Status: SHIPPED | OUTPATIENT
Start: 2022-04-22 | End: 2022-04-22 | Stop reason: ALTCHOICE

## 2022-04-22 NOTE — PROGRESS NOTES
"Chief Complaint  Numbness (Pt states it feels like pins and needles in his left hand)    Subjective    Patient is a 21 year old male in today with complaints of arm numbness. Patient reports he had an infected ingrown fingernail that is looking better but he was worried because he had numbness up his arm, that has now resolved.   Patient is also asking for a refill of his Lamictal because he is almost out and he is worried about withdraw because he was told not to stop abruptly. Patient reports he feels like his current regimen is working for him and his mood has been improved and he feels less socially aquward.           Anthony Willams presents to [facility]  Depression  Visit Type: follow-up  Patient presents with the following symptoms: decreased concentration, depressed mood and excessive worry.  Patient is not experiencing: suicidal ideas and suicidal planning.  Frequency of symptoms: most days   Severity: severe   Sleep quality: good  Nighttime awakenings: occasional        Objective   Vital Signs:   /63 (BP Location: Right arm, Patient Position: Sitting, Cuff Size: Adult)   Pulse 87   Temp 98.6 °F (37 °C) (Oral)   Ht 166.4 cm (65.51\")   Wt 65.4 kg (144 lb 3.2 oz)   SpO2 96% Comment: Room air  BMI 23.62 kg/m²     BMI is within normal parameters. No follow-up required.      Physical Exam  HENT:      Head: Normocephalic.   Cardiovascular:      Rate and Rhythm: Normal rate and regular rhythm.   Pulmonary:      Effort: Pulmonary effort is normal. No respiratory distress.      Breath sounds: Normal breath sounds. No stridor. No wheezing, rhonchi or rales.   Skin:     General: Skin is warm and dry.          Neurological:      Mental Status: She is alert and oriented to person, place, and time.   Psychiatric:         Attention and Perception: Attention normal.         Mood and Affect: Mood normal.         Thought Content: Thought content is paranoid. Thought content does " not include suicidal plan.        Result Review :                 Assessment and Plan    Diagnoses and all orders for this visit:    1. Moderate episode of recurrent major depressive disorder (HCC) (Primary)  -     Discontinue: lamoTRIgine (LaMICtal) 150 MG tablet; Take 1 tablet by mouth 2 (Two) Times a Day. Indications: Depressive Phase of Manic-Depression  Dispense: 60 tablet; Refill: 1  -     lamoTRIgine (LaMICtal) 150 MG tablet; Take 1 tablet by mouth 2 (Two) Times a Day. Indications: Depressive Phase of Manic-Depression  Dispense: 60 tablet; Refill: 1    2. Finger infection  -     sulfamethoxazole-trimethoprim (Bactrim DS) 800-160 MG per tablet; Take 1 tablet by mouth 2 (Two) Times a Day for 5 days.  Dispense: 10 tablet; Refill: 0        Follow Up {Instructions Charge Capture  Follow-up Communications :23}  Return if symptoms worsen or fail to improve.  Patient was given instructions and counseling regarding her condition or for health maintenance advice. Please see specific information pulled into the AVS if appropriate.

## 2022-04-26 RX ORDER — LAMOTRIGINE 150 MG/1
150 TABLET ORAL 2 TIMES DAILY
Qty: 60 TABLET | Refills: 1 | Status: SHIPPED | OUTPATIENT
Start: 2022-04-26 | End: 2022-07-08

## 2022-06-06 ENCOUNTER — LAB (OUTPATIENT)
Dept: LAB | Facility: HOSPITAL | Age: 22
End: 2022-06-06

## 2022-06-06 ENCOUNTER — OFFICE VISIT (OUTPATIENT)
Dept: FAMILY MEDICINE CLINIC | Age: 22
End: 2022-06-06

## 2022-06-06 VITALS
BODY MASS INDEX: 23.14 KG/M2 | WEIGHT: 144 LBS | DIASTOLIC BLOOD PRESSURE: 74 MMHG | SYSTOLIC BLOOD PRESSURE: 108 MMHG | HEIGHT: 66 IN | OXYGEN SATURATION: 98 % | HEART RATE: 74 BPM

## 2022-06-06 DIAGNOSIS — Z72.51 HIGH RISK SEXUAL BEHAVIOR, UNSPECIFIED TYPE: Primary | ICD-10-CM

## 2022-06-06 DIAGNOSIS — Z72.53 HIGH RISK BISEXUAL BEHAVIOR: ICD-10-CM

## 2022-06-06 LAB
C TRACH RRNA CVX QL NAA+PROBE: NOT DETECTED
HAV IGM SERPL QL IA: NORMAL
HBV CORE IGM SERPL QL IA: NORMAL
HBV SURFACE AG SERPL QL IA: NORMAL
HCV AB SER DONR QL: NORMAL
HIV1+2 AB SER QL: NORMAL
N GONORRHOEA RRNA SPEC QL NAA+PROBE: NOT DETECTED

## 2022-06-06 PROCEDURE — 86696 HERPES SIMPLEX TYPE 2 TEST: CPT

## 2022-06-06 PROCEDURE — 36415 COLL VENOUS BLD VENIPUNCTURE: CPT

## 2022-06-06 PROCEDURE — G0432 EIA HIV-1/HIV-2 SCREEN: HCPCS

## 2022-06-06 PROCEDURE — 86695 HERPES SIMPLEX TYPE 1 TEST: CPT

## 2022-06-06 PROCEDURE — 87591 N.GONORRHOEAE DNA AMP PROB: CPT | Performed by: NURSE PRACTITIONER

## 2022-06-06 PROCEDURE — 80074 ACUTE HEPATITIS PANEL: CPT

## 2022-06-06 PROCEDURE — 99212 OFFICE O/P EST SF 10 MIN: CPT | Performed by: NURSE PRACTITIONER

## 2022-06-06 PROCEDURE — 87491 CHLMYD TRACH DNA AMP PROBE: CPT | Performed by: NURSE PRACTITIONER

## 2022-06-06 PROCEDURE — 86592 SYPHILIS TEST NON-TREP QUAL: CPT

## 2022-06-06 NOTE — PROGRESS NOTES
"Chief Complaint  Other (Patient is wanting to be tested for STD including HIV. Patient also complains of raised red bump on right lower inner leg states he noticed it last nigh t)    Subjective    Patient is 21 years old and today and would like to have STD testing.  Patient currently denies any symptoms, has just been increasingly concerned about health.        Anthony Willams presents to [facility]  History of Present Illness    Objective   Vital Signs:  /74 (BP Location: Right arm, Patient Position: Sitting, Cuff Size: Adult)   Pulse 74   Ht 166.4 cm (65.51\")   Wt 65.3 kg (144 lb)   SpO2 98%   BMI 23.59 kg/m²     BMI is within normal parameters. No other follow-up for BMI required.      Physical Exam  HENT:      Head: Normocephalic.   Cardiovascular:      Rate and Rhythm: Normal rate.   Pulmonary:      Effort: Pulmonary effort is normal.   Skin:     General: Skin is warm and dry.      Findings: No abrasion or erythema.          Neurological:      Mental Status: She is alert and oriented to person, place, and time.   Psychiatric:         Mood and Affect: Mood normal.        Result Review :                Assessment and Plan   Diagnoses and all orders for this visit:    1. High risk sexual behavior, unspecified type (Primary)  -     Hepatitis panel, acute; Future  -     HIV-1/O/2 ANTIGEN/ANTIBODY, 4TH GENERATION; Future  -     HSV 1 and 2-Specific Ab, IgG; Future  -     RPR; Future  -     Chlamydia trachomatis, Neisseria gonorrhoeae, PCR - , Urine, Clean Catch; Future  -     Chlamydia trachomatis, Neisseria gonorrhoeae, PCR - , Urine, Clean Catch             Follow Up   Return if symptoms worsen or fail to improve.  Patient was given instructions and counseling regarding her condition or for health maintenance advice. Please see specific information pulled into the AVS if appropriate.       "

## 2022-06-07 LAB
HSV1 IGG SER IA-ACNC: <0.91 INDEX (ref 0–0.9)
HSV2 IGG SER IA-ACNC: <0.91 INDEX (ref 0–0.9)
RPR SER QL: NORMAL

## 2022-07-07 DIAGNOSIS — F33.1 MODERATE EPISODE OF RECURRENT MAJOR DEPRESSIVE DISORDER: ICD-10-CM

## 2022-07-08 RX ORDER — LAMOTRIGINE 150 MG/1
TABLET ORAL
Qty: 60 TABLET | Refills: 1 | Status: SHIPPED | OUTPATIENT
Start: 2022-07-08

## 2022-07-28 RX ORDER — FLUVOXAMINE MALEATE 100 MG/1
CAPSULE, EXTENDED RELEASE ORAL
Qty: 90 EACH | Refills: 1 | Status: SHIPPED | OUTPATIENT
Start: 2022-07-28

## 2024-05-13 ENCOUNTER — TRANSCRIBE ORDERS (OUTPATIENT)
Dept: ADMINISTRATIVE | Facility: HOSPITAL | Age: 24
End: 2024-05-13
Payer: COMMERCIAL

## 2024-05-13 DIAGNOSIS — N64.4 PAIN OF BREAST: Primary | ICD-10-CM

## 2024-05-20 ENCOUNTER — HOSPITAL ENCOUNTER (OUTPATIENT)
Dept: ULTRASOUND IMAGING | Facility: HOSPITAL | Age: 24
Discharge: HOME | End: 2024-05-20
Admitting: STUDENT IN AN ORGANIZED HEALTH CARE EDUCATION/TRAINING PROGRAM
Payer: COMMERCIAL

## 2024-05-20 DIAGNOSIS — N64.4 PAIN OF BREAST: ICD-10-CM

## 2024-05-20 PROCEDURE — 76642 ULTRASOUND BREAST LIMITED: CPT

## 2025-04-24 NOTE — PROGRESS NOTES
"[facility] IOP Group note  Observations:    Engaged in Activity / Process and Self -disclosed: Yes  Applies Topic to self: Yes  Able to give Constructive Feedback: Yes  Affect: angry  Degree of Insightful Thinking 5  Notes:  [id number]  Pt processed \"I can not trust myself not to use MJ\" Pt will go to NA meeting and find a sponsor ASAP. Pt has \"no\" SI. Pt has high cravings\" Pt encouraged and educated that the primary relapse trigger for MJ is paraphernalia. Pt will trash all his paraphernalia today after group. Pt has \"no\" SI.       Yuko Greer, ROLANDOW            "
"[facility] IOP Group note  Observations:    Engaged in Activity / Process and Self -disclosed: Yes  Applies Topic to self: Yes  Able to give Constructive Feedback: Yes  Affect: anxious  Degree of Insightful Thinking 6  Notes:  [id number]  Assisted pt in formulation of and processing his relapse prevention plan. Pt has \"no\" YOLANDA Greer LCSW            "
Teaching Record:  Information / activity:  Spirituality    Good participation  [id number]      Yuko Greer, LCSW            
[Negative] : Heme/Lymph

## 2025-06-27 NOTE — CONSULTS
"Spiritual Care Follow-up    Purpose of Visit: Termination    The following are responses to several spiritual assessment questions the pt answered to help guide pt to discern own hopes for the future after termination of our sessions.      Blessings From the Beginning:  Pt described gift of \"listening to others\" and being \"assertive\" when needed.    What do you think you were meant to do?  \"Help people\"    If you were to blame someone for your present trouble in life, whom would you blame?  \"God\" bc there is a lack of \"meaning\" and feeling of \"purpose\"    If you pray/meditate, do you think the divine would understand your deepest angers?  \"Of course\" Pt believes God is \"compassionate no matter what.\"    Does the divine want you to do what would make you happy?  \"Yes, I think God wants me to help people, and I think I'll be happy doing it eventually.\"     What is the divine like?  \"Energy\"     Settled Beliefs:  Pt does not believe there are \"bad people.\" Pt believes people \"make choices that make things better or worse.\"    Hopes:  Pt stated they \"hope to be happy someday\"   Pt desires to \"help people\" bc they feel they have a \"gift\" of \"listening.\"      Final Assessment:  I recommend the pt continue sessions with primary therapist, and encouraged open dialogue w family. Pt described self as \"optimistic.\"     No need for follow-up  " Mckenzie RN: Pt received to room 10B , A&Ox4, ambulatory at baseline,  coming to the ED for c/o medial lower back pain that started June 5th and progressively worsened. States she was seen here and discharged with no relief.  Pt denies chest pain, sob, fevers, chills, N/V/D, abdominal pain, dizziness, blurry vision, headache, urinary symptoms. RR equal and unlabored on room air. Abdomen soft, non-tender, non-distended.  20G IV placed to the R AC, labs drawn and sent. medicated as ordered. Care plan continued. Comfort measures provided. Safety maintained. Awaiting lab results and imaging. report given to primary nurse  .